# Patient Record
Sex: FEMALE | Race: WHITE | NOT HISPANIC OR LATINO | URBAN - METROPOLITAN AREA
[De-identification: names, ages, dates, MRNs, and addresses within clinical notes are randomized per-mention and may not be internally consistent; named-entity substitution may affect disease eponyms.]

---

## 2018-03-03 ENCOUNTER — INPATIENT HOSPITAL (OUTPATIENT)
Dept: URBAN - METROPOLITAN AREA HOSPITAL 107 | Facility: HOSPITAL | Age: 43
End: 2018-03-03
Payer: MEDICARE

## 2018-03-03 DIAGNOSIS — R10.11 RIGHT UPPER QUADRANT PAIN: ICD-10-CM

## 2018-03-03 DIAGNOSIS — K80.51 CALCULUS OF BILE DUCT WITHOUT CHOLANGITIS OR CHOLECYSTITIS W: ICD-10-CM

## 2018-03-03 DIAGNOSIS — R94.5 ABNORMAL RESULTS OF LIVER FUNCTION STUDIES: ICD-10-CM

## 2018-03-03 PROCEDURE — 99223 1ST HOSP IP/OBS HIGH 75: CPT | Performed by: INTERNAL MEDICINE

## 2018-03-04 PROCEDURE — 99252 IP/OBS CONSLTJ NEW/EST SF 35: CPT | Performed by: INTERNAL MEDICINE

## 2018-03-04 PROCEDURE — 99232 SBSQ HOSP IP/OBS MODERATE 35: CPT | Performed by: INTERNAL MEDICINE

## 2020-06-06 ENCOUNTER — HOSPITAL ENCOUNTER (OUTPATIENT)
Facility: HOSPITAL | Age: 45
Setting detail: OBSERVATION
LOS: 1 days | Discharge: HOME | End: 2020-06-09
Attending: EMERGENCY MEDICINE | Admitting: HOSPITALIST

## 2020-06-06 DIAGNOSIS — T39.1X2A INTENTIONAL ACETAMINOPHEN OVERDOSE, INITIAL ENCOUNTER (HCC): Primary | ICD-10-CM

## 2020-06-06 DIAGNOSIS — F10.920 ALCOHOLIC INTOXICATION WITHOUT COMPLICATION (HCC): ICD-10-CM

## 2020-06-06 DIAGNOSIS — F32.A DEPRESSION, UNSPECIFIED DEPRESSION TYPE: ICD-10-CM

## 2020-06-06 LAB
ALBUMIN SERPL-MCNC: 4.1 G/DL (ref 3.5–5.2)
ALBUMIN/GLOB SERPL: 1.4 G/DL
ALP SERPL-CCNC: 76 U/L (ref 39–117)
ALT SERPL W P-5'-P-CCNC: 10 U/L (ref 1–33)
AMPHET+METHAMPHET UR QL: NEGATIVE
ANION GAP SERPL CALCULATED.3IONS-SCNC: 11.5 MMOL/L (ref 5–15)
APAP SERPL-MCNC: 107 MCG/ML (ref 10–30)
APAP SERPL-MCNC: 125 MCG/ML (ref 10–30)
AST SERPL-CCNC: 13 U/L (ref 1–32)
BARBITURATES UR QL SCN: NEGATIVE
BASOPHILS # BLD AUTO: 0.08 10*3/MM3 (ref 0–0.2)
BASOPHILS NFR BLD AUTO: 0.9 % (ref 0–1.5)
BENZODIAZ UR QL SCN: NEGATIVE
BILIRUB SERPL-MCNC: 0.2 MG/DL (ref 0.2–1.2)
BUN BLD-MCNC: 11 MG/DL (ref 6–20)
BUN/CREAT SERPL: 12.1 (ref 7–25)
CALCIUM SPEC-SCNC: 8.5 MG/DL (ref 8.6–10.5)
CANNABINOIDS SERPL QL: NEGATIVE
CHLORIDE SERPL-SCNC: 110 MMOL/L (ref 98–107)
CO2 SERPL-SCNC: 19.5 MMOL/L (ref 22–29)
COCAINE UR QL: NEGATIVE
CREAT BLD-MCNC: 0.91 MG/DL (ref 0.57–1)
DEPRECATED RDW RBC AUTO: 45.8 FL (ref 37–54)
EOSINOPHIL # BLD AUTO: 0.06 10*3/MM3 (ref 0–0.4)
EOSINOPHIL NFR BLD AUTO: 0.7 % (ref 0.3–6.2)
ERYTHROCYTE [DISTWIDTH] IN BLOOD BY AUTOMATED COUNT: 13.8 % (ref 12.3–15.4)
ETHANOL BLD-MCNC: 138 MG/DL (ref 0–10)
ETHANOL UR QL: 0.14 %
GFR SERPL CREATININE-BSD FRML MDRD: 67 ML/MIN/1.73
GFR SERPL CREATININE-BSD FRML MDRD: 81 ML/MIN/1.73
GLOBULIN UR ELPH-MCNC: 2.9 GM/DL
GLUCOSE BLD-MCNC: 127 MG/DL (ref 65–99)
HCG SERPL QL: NEGATIVE
HCT VFR BLD AUTO: 40.1 % (ref 34–46.6)
HGB BLD-MCNC: 13.4 G/DL (ref 12–15.9)
HOLD SPECIMEN: NORMAL
HOLD SPECIMEN: NORMAL
IMM GRANULOCYTES # BLD AUTO: 0.02 10*3/MM3 (ref 0–0.05)
IMM GRANULOCYTES NFR BLD AUTO: 0.2 % (ref 0–0.5)
LYMPHOCYTES # BLD AUTO: 2.74 10*3/MM3 (ref 0.7–3.1)
LYMPHOCYTES NFR BLD AUTO: 31 % (ref 19.6–45.3)
MCH RBC QN AUTO: 30.4 PG (ref 26.6–33)
MCHC RBC AUTO-ENTMCNC: 33.4 G/DL (ref 31.5–35.7)
MCV RBC AUTO: 90.9 FL (ref 79–97)
METHADONE UR QL SCN: POSITIVE
MONOCYTES # BLD AUTO: 0.66 10*3/MM3 (ref 0.1–0.9)
MONOCYTES NFR BLD AUTO: 7.5 % (ref 5–12)
NEUTROPHILS # BLD AUTO: 5.28 10*3/MM3 (ref 1.7–7)
NEUTROPHILS NFR BLD AUTO: 59.7 % (ref 42.7–76)
NRBC BLD AUTO-RTO: 0 /100 WBC (ref 0–0.2)
OPIATES UR QL: NEGATIVE
OXYCODONE UR QL SCN: NEGATIVE
PLATELET # BLD AUTO: 417 10*3/MM3 (ref 140–450)
PMV BLD AUTO: 10.3 FL (ref 6–12)
POTASSIUM BLD-SCNC: 4.5 MMOL/L (ref 3.5–5.2)
PROT SERPL-MCNC: 7 G/DL (ref 6–8.5)
RBC # BLD AUTO: 4.41 10*6/MM3 (ref 3.77–5.28)
SALICYLATES SERPL-MCNC: <0.3 MG/DL
SODIUM BLD-SCNC: 141 MMOL/L (ref 136–145)
TROPONIN T SERPL-MCNC: <0.01 NG/ML (ref 0–0.03)
WBC NRBC COR # BLD: 8.84 10*3/MM3 (ref 3.4–10.8)
WHOLE BLOOD HOLD SPECIMEN: NORMAL
WHOLE BLOOD HOLD SPECIMEN: NORMAL

## 2020-06-06 PROCEDURE — 93010 ELECTROCARDIOGRAM REPORT: CPT | Performed by: INTERNAL MEDICINE

## 2020-06-06 PROCEDURE — 84484 ASSAY OF TROPONIN QUANT: CPT | Performed by: EMERGENCY MEDICINE

## 2020-06-06 PROCEDURE — 80307 DRUG TEST PRSMV CHEM ANLYZR: CPT | Performed by: EMERGENCY MEDICINE

## 2020-06-06 PROCEDURE — 85025 COMPLETE CBC W/AUTO DIFF WBC: CPT | Performed by: EMERGENCY MEDICINE

## 2020-06-06 PROCEDURE — 93005 ELECTROCARDIOGRAM TRACING: CPT | Performed by: EMERGENCY MEDICINE

## 2020-06-06 PROCEDURE — 84703 CHORIONIC GONADOTROPIN ASSAY: CPT | Performed by: EMERGENCY MEDICINE

## 2020-06-06 PROCEDURE — 99285 EMERGENCY DEPT VISIT HI MDM: CPT

## 2020-06-06 PROCEDURE — 96360 HYDRATION IV INFUSION INIT: CPT

## 2020-06-06 PROCEDURE — 36415 COLL VENOUS BLD VENIPUNCTURE: CPT

## 2020-06-06 PROCEDURE — 80053 COMPREHEN METABOLIC PANEL: CPT | Performed by: EMERGENCY MEDICINE

## 2020-06-06 RX ORDER — SODIUM CHLORIDE 0.9 % (FLUSH) 0.9 %
10 SYRINGE (ML) INJECTION AS NEEDED
Status: DISCONTINUED | OUTPATIENT
Start: 2020-06-06 | End: 2020-06-09 | Stop reason: HOSPADM

## 2020-06-06 RX ADMIN — SODIUM CHLORIDE 1000 ML: 9 INJECTION, SOLUTION INTRAVENOUS at 21:24

## 2020-06-07 PROBLEM — T39.1X2A INTENTIONAL ACETAMINOPHEN OVERDOSE (HCC): Status: ACTIVE | Noted: 2020-06-07

## 2020-06-07 PROBLEM — R45.851 SUICIDAL IDEATIONS: Status: ACTIVE | Noted: 2020-06-07

## 2020-06-07 PROBLEM — I10 HYPERTENSION: Status: ACTIVE | Noted: 2020-06-07

## 2020-06-07 PROBLEM — G89.4 CHRONIC PAIN SYNDROME: Status: ACTIVE | Noted: 2020-06-07

## 2020-06-07 PROBLEM — Z98.890 S/P SHOULDER SURGERY: Status: ACTIVE | Noted: 2020-06-07

## 2020-06-07 PROBLEM — F10.929 ALCOHOL INTOXICATION (HCC): Status: ACTIVE | Noted: 2020-06-07

## 2020-06-07 PROBLEM — E11.9 DIABETES MELLITUS (HCC): Status: ACTIVE | Noted: 2020-06-07

## 2020-06-07 PROBLEM — Z87.898 HISTORY OF PSEUDOSEIZURE: Status: ACTIVE | Noted: 2020-06-07

## 2020-06-07 PROBLEM — F11.20 OPIOID DEPENDENCE (HCC): Status: ACTIVE | Noted: 2020-06-07

## 2020-06-07 LAB
APAP SERPL-MCNC: 110 MCG/ML (ref 10–30)
APAP SERPL-MCNC: 8 MCG/ML (ref 10–30)

## 2020-06-07 PROCEDURE — 96361 HYDRATE IV INFUSION ADD-ON: CPT

## 2020-06-07 PROCEDURE — G0378 HOSPITAL OBSERVATION PER HR: HCPCS

## 2020-06-07 PROCEDURE — 80307 DRUG TEST PRSMV CHEM ANLYZR: CPT | Performed by: NURSE PRACTITIONER

## 2020-06-07 PROCEDURE — 96372 THER/PROPH/DIAG INJ SC/IM: CPT

## 2020-06-07 PROCEDURE — 25010000002 ENOXAPARIN PER 10 MG: Performed by: NURSE PRACTITIONER

## 2020-06-07 RX ORDER — PANTOPRAZOLE SODIUM 40 MG/1
40 TABLET, DELAYED RELEASE ORAL ONCE
Status: COMPLETED | OUTPATIENT
Start: 2020-06-07 | End: 2020-06-07

## 2020-06-07 RX ORDER — PANTOPRAZOLE SODIUM 40 MG/1
40 TABLET, DELAYED RELEASE ORAL EVERY MORNING
Status: DISCONTINUED | OUTPATIENT
Start: 2020-06-08 | End: 2020-06-09 | Stop reason: HOSPADM

## 2020-06-07 RX ORDER — MELOXICAM 15 MG/1
15 TABLET ORAL DAILY PRN
COMMUNITY

## 2020-06-07 RX ORDER — TIZANIDINE 4 MG/1
4 TABLET ORAL NIGHTLY PRN
Status: DISCONTINUED | OUTPATIENT
Start: 2020-06-07 | End: 2020-06-09 | Stop reason: HOSPADM

## 2020-06-07 RX ORDER — SODIUM CHLORIDE 0.9 % (FLUSH) 0.9 %
10 SYRINGE (ML) INJECTION AS NEEDED
Status: DISCONTINUED | OUTPATIENT
Start: 2020-06-07 | End: 2020-06-07

## 2020-06-07 RX ORDER — SODIUM CHLORIDE 9 MG/ML
100 INJECTION, SOLUTION INTRAVENOUS CONTINUOUS
Status: DISCONTINUED | OUTPATIENT
Start: 2020-06-07 | End: 2020-06-08

## 2020-06-07 RX ORDER — CALCIUM CARBONATE 200(500)MG
1 TABLET,CHEWABLE ORAL 3 TIMES DAILY PRN
Status: DISCONTINUED | OUTPATIENT
Start: 2020-06-07 | End: 2020-06-09 | Stop reason: HOSPADM

## 2020-06-07 RX ORDER — OXYCODONE HYDROCHLORIDE AND ACETAMINOPHEN 5; 325 MG/1; MG/1
1 TABLET ORAL EVERY 6 HOURS PRN
COMMUNITY

## 2020-06-07 RX ORDER — PRAMIPEXOLE DIHYDROCHLORIDE 1 MG/1
1 TABLET ORAL NIGHTLY
COMMUNITY

## 2020-06-07 RX ORDER — MELOXICAM 15 MG/1
15 TABLET ORAL DAILY PRN
Status: DISCONTINUED | OUTPATIENT
Start: 2020-06-07 | End: 2020-06-07

## 2020-06-07 RX ORDER — GABAPENTIN 100 MG/1
100 CAPSULE ORAL 3 TIMES DAILY
Status: DISCONTINUED | OUTPATIENT
Start: 2020-06-07 | End: 2020-06-09 | Stop reason: HOSPADM

## 2020-06-07 RX ORDER — PRAMIPEXOLE DIHYDROCHLORIDE 1 MG/1
1 TABLET ORAL NIGHTLY
Status: DISCONTINUED | OUTPATIENT
Start: 2020-06-07 | End: 2020-06-09 | Stop reason: HOSPADM

## 2020-06-07 RX ORDER — SODIUM CHLORIDE 0.9 % (FLUSH) 0.9 %
10 SYRINGE (ML) INJECTION EVERY 12 HOURS SCHEDULED
Status: DISCONTINUED | OUTPATIENT
Start: 2020-06-07 | End: 2020-06-07

## 2020-06-07 RX ORDER — AMITRIPTYLINE HYDROCHLORIDE 50 MG/1
50 TABLET, FILM COATED ORAL NIGHTLY
Status: DISCONTINUED | OUTPATIENT
Start: 2020-06-07 | End: 2020-06-09 | Stop reason: HOSPADM

## 2020-06-07 RX ADMIN — QUETIAPINE FUMARATE 300 MG: 100 TABLET ORAL at 22:14

## 2020-06-07 RX ADMIN — SODIUM CHLORIDE 100 ML/HR: 9 INJECTION, SOLUTION INTRAVENOUS at 15:39

## 2020-06-07 RX ADMIN — GABAPENTIN 100 MG: 100 CAPSULE ORAL at 15:39

## 2020-06-07 RX ADMIN — ENOXAPARIN SODIUM 40 MG: 40 INJECTION SUBCUTANEOUS at 18:24

## 2020-06-07 RX ADMIN — PANTOPRAZOLE SODIUM 40 MG: 40 TABLET, DELAYED RELEASE ORAL at 15:58

## 2020-06-07 RX ADMIN — PRAMIPEXOLE DIHYDROCHLORIDE 1 MG: 1 TABLET ORAL at 22:15

## 2020-06-07 RX ADMIN — GABAPENTIN 100 MG: 100 CAPSULE ORAL at 20:32

## 2020-06-07 RX ADMIN — PROPRANOLOL HYDROCHLORIDE 60 MG: 40 TABLET ORAL at 22:36

## 2020-06-07 RX ADMIN — AMITRIPTYLINE HYDROCHLORIDE 50 MG: 50 TABLET, FILM COATED ORAL at 22:15

## 2020-06-07 NOTE — H&P
"    Patient Name:  Concepcion Ocampo  YOB: 1975  MRN:  [id number]  Admit Date:  6/6/2020  Patient Care Team:  Jeremy Shankar MD as PCP - General (Family Medicine)      Subjective   History Present Illness     Chief Complaint   Patient presents with   • Drug Overdose     History of Present Illness   Ms. Ocampo is a 44 y.o. non-smoker with a history of DM2, prior gastric bypass, recent rotator cuff repair, HTN, pseudoseizures and chronic pain syndrome that presents to [facility] for overdose. The patient reportedly drank alcohol yesterday and ingested an unknown amount of Tylenol in an attempt to end her life last night. She was picked up by EMS after family called 911 and brought to the emergency room where her acetaminophen levels were 107, 125 and 110. Repeat this morning was at 8.0. Her ethanol level was also up at 138 after she reported drinking 4 large bottle/cans of [place] wine coolers. She does not usually drink alcohol on a regular basis by her report. All other lab work was unremarkable and she was seen in consultation by [facility] and is need of psychiatric admission. Unfortunately, there were no beds available so she is being monitored in the medical portion of the hospital for now.   She denies any recent physical complaints including chest pain, dyspnea, nausea, vomiting or changes in bowel or bladder. She reports a history of gastric bypass as well as DM2, which improved with her weight loss. She does not take regular medication for her DM. She did recently have a rotator cuff repair done on 5/19/20 with a  with [employer] and has a sling in place for this. She states she has been on Percocet 5 mg for this reason as well as as history of chronic regional pain syndrome for which she sees Pain Management. She thinks the name of her pain medicine doctor is \"Dr. Macdonald,\" but is unsure as she normally sees the APRN there. Orthopedic surgery did prescribe her Oxycodone " for her shoulder pain it appears from prior chart review. However, there is no BRITTNI available to confirm the details of her pain medication. She also has a documented history of non-epileptic seizures and was hospitalized in [place] in 2019. She does not take any anticonvulsant therapy and has not had any further seizure-like activity since that time.      Review of Systems   Constitutional: Negative for activity change, appetite change, chills, fatigue and fever.   HENT: Negative for congestion, ear pain and nosebleeds.    Eyes: Negative for pain and discharge.   Respiratory: Negative for cough, choking, chest tightness and shortness of breath.    Cardiovascular: Negative for chest pain and leg swelling.   Gastrointestinal: Negative for abdominal distention, abdominal pain, constipation, diarrhea and vomiting.   Endocrine: Negative for cold intolerance and heat intolerance.   Genitourinary: Negative for difficulty urinating and dysuria.   Musculoskeletal: Positive for arthralgias. Negative for back pain and gait problem.   Skin: Negative for color change and pallor.   Neurological: Negative for dizziness, speech difficulty and headaches.   Psychiatric/Behavioral: Negative for confusion. The patient is not nervous/anxious.       Personal History     Past Medical History:   Diagnosis Date   • Diabetes mellitus (CMS/HCC)    • Hypertension    • Seizures (CMS/HCC)      Past Surgical History:   Procedure Laterality Date   • GASTRIC BYPASS     • ROTATOR CUFF REPAIR       No family history on file.  Social History     Tobacco Use   • Smoking status: Not on file   Substance Use Topics   • Alcohol use: Not on file   • Drug use: Not on file       (Not in a hospital admission)  Allergies:    Allergies   Allergen Reactions   • Albuterol Provider Review Needed       Objective    Objective     Vital Signs  Temp:  [98.1 °F (36.7 °C)-98.5 °F (36.9 °C)] 98.1 °F (36.7 °C)  Heart Rate:  [] 81  Resp:  [16-18] 17  BP:  (149-175)/() 153/95  SpO2:  [93 %-100 %] 98 %  on   ;      Body mass index is 23.49 kg/m².    Physical Exam   Constitutional: She is oriented to person, place, and time. She appears well-developed and well-nourished. No distress.   HENT:   Head: Normocephalic and atraumatic.   Nose: Nose normal.   Mouth/Throat: Oropharynx is clear and moist.   Eyes: Conjunctivae are normal. Right eye exhibits no discharge. Left eye exhibits no discharge.   Neck: Normal range of motion. Neck supple.   Cardiovascular: Normal rate, regular rhythm, normal heart sounds and intact distal pulses.   Pulmonary/Chest: Effort normal and breath sounds normal. No respiratory distress.   Abdominal: Soft. Bowel sounds are normal. She exhibits no distension. There is no tenderness.   Musculoskeletal: Normal range of motion. She exhibits no edema or tenderness.   Right arm in sling.   Neurological: She is alert and oriented to person, place, and time. She exhibits normal muscle tone. Coordination normal.   Skin: Skin is warm and dry. She is not diaphoretic. No erythema.   Psychiatric: She has a normal mood and affect. Her behavior is normal.   Nursing note and vitals reviewed.     Results Review:  I reviewed the patient's new clinical results.  I reviewed the patient's new imaging results and agree with the interpretation.  I reviewed the patient's other test results and agree with the interpretation  I personally viewed and interpreted the patient's EKG/Telemetry data    Lab Results (last 24 hours)     Procedure Component Value Units Date/Time    CBC & Differential [[id number]] Collected:  06/06/20 2051    Specimen:  Blood Updated:  06/06/20 2104    Narrative:       The following orders were created for panel order CBC & Differential.  Procedure                               Abnormality         Status                     ---------                               -----------         ------                     CBC Auto Differential[[id number]]         Normal              Final result                 Please view results for these tests on the individual orders.    hCG, Serum, Qualitative [[id number]]  (Normal) Collected:  06/06/20 2051    Specimen:  Blood Updated:  06/06/20 2119     HCG Qualitative Negative    Acetaminophen Level [[id number]]  (Abnormal) Collected:  06/06/20 2051    Specimen:  Blood Updated:  06/06/20 2132     Acetaminophen 107.0 mcg/mL     Salicylate Level [[id number]]  (Normal) Collected:  06/06/20 2051    Specimen:  Blood Updated:  06/06/20 2128     Salicylate <0.3 mg/dL     Narrative:       Therapeutic range for Salicylates:  3.0 - 10.0 mg/dL for antipyretic/analgesic conditions  15.0 - 30.0 mg/dL for anti-inflammatory conditions    Ethanol [[id number]]  (Abnormal) Collected:  06/06/20 2051    Specimen:  Blood Updated:  06/06/20 2128     Ethanol 138 mg/dL      Ethanol % 0.138 %     Troponin [[id number]]  (Normal) Collected:  06/06/20 2051    Specimen:  Blood Updated:  06/06/20 2127     Troponin T <0.010 ng/mL     Narrative:       Troponin T Reference Range:  <= 0.03 ng/mL-   Negative for AMI  >0.03 ng/mL-     Abnormal for myocardial necrosis.  Clinicians would have to utilize clinical acumen, EKG, Troponin and serial changes to determine if it is an Acute Myocardial Infarction or myocardial injury due to an underlying chronic condition.       Results may be falsely decreased if patient taking Biotin.      Comprehensive Metabolic Panel [[id number]]  (Abnormal) Collected:  06/06/20 2051    Specimen:  Blood Updated:  06/06/20 2130     Glucose 127 mg/dL      BUN 11 mg/dL      Creatinine 0.91 mg/dL      Sodium 141 mmol/L      Potassium 4.5 mmol/L      Chloride 110 mmol/L      CO2 19.5 mmol/L      Calcium 8.5 mg/dL      Total Protein 7.0 g/dL      Albumin 4.10 g/dL      ALT (SGPT) 10 U/L      AST (SGOT) 13 U/L      Comment: Specimen hemolyzed.  Results may be affected.        Alkaline Phosphatase 76 U/L      Total Bilirubin 0.2 mg/dL      eGFR Non   Amer 67 mL/min/1.73      eGFR  African Amer 81 mL/min/1.73      Globulin 2.9 gm/dL      A/G Ratio 1.4 g/dL      BUN/Creatinine Ratio 12.1     Anion Gap 11.5 mmol/L     Narrative:       GFR Normal >60  Chronic Kidney Disease <60  Kidney Failure <15      CBC Auto Differential [[id number]]  (Normal) Collected:  06/06/20 2051    Specimen:  Blood Updated:  06/06/20 2104     WBC 8.84 10*3/mm3      RBC 4.41 10*6/mm3      Hemoglobin 13.4 g/dL      Hematocrit 40.1 %      MCV 90.9 fL      MCH 30.4 pg      MCHC 33.4 g/dL      RDW 13.8 %      RDW-SD 45.8 fl      MPV 10.3 fL      Platelets 417 10*3/mm3      Neutrophil % 59.7 %      Lymphocyte % 31.0 %      Monocyte % 7.5 %      Eosinophil % 0.7 %      Basophil % 0.9 %      Immature Grans % 0.2 %      Neutrophils, Absolute 5.28 10*3/mm3      Lymphocytes, Absolute 2.74 10*3/mm3      Monocytes, Absolute 0.66 10*3/mm3      Eosinophils, Absolute 0.06 10*3/mm3      Basophils, Absolute 0.08 10*3/mm3      Immature Grans, Absolute 0.02 10*3/mm3      nRBC 0.0 /100 WBC     Urine Drug Screen - Urine, Clean Catch [[id number]]  (Abnormal) Collected:  06/06/20 2133    Specimen:  Urine, Clean Catch Updated:  06/06/20 2203     Amphet/Methamphet, Screen Negative     Barbiturates Screen, Urine Negative     Benzodiazepine Screen, Urine Negative     Cocaine Screen, Urine Negative     Opiate Screen Negative     THC, Screen, Urine Negative     Methadone Screen, Urine Positive     Oxycodone Screen, Urine Negative    Narrative:       Negative Thresholds For Drugs Screened:     Amphetamines               500 ng/ml   Barbiturates               200 ng/ml   Benzodiazepines            100 ng/ml   Cocaine                    300 ng/ml   Methadone                  300 ng/ml   Opiates                    300 ng/ml   Oxycodone                  100 ng/ml   THC                        50 ng/ml    The Normal Value for all drugs tested is negative. This report includes final unconfirmed screening results to be  used for medical treatment purposes only. Unconfirmed results must not be used for non-medical purposes such as employment or legal testing. Clinical consideration should be applied to any drug of abuse test, particulary when unconfirmed results are used.    Acetaminophen Level [[id number]]  (Abnormal) Collected:  06/06/20 2236    Specimen:  Blood from Hand, Right Updated:  06/06/20 2311     Acetaminophen 125.0 mcg/mL     Acetaminophen Level [[id number]]  (Abnormal) Collected:  06/06/20 2346    Specimen:  Blood from Arm, Left Updated:  06/07/20 0012     Acetaminophen 110.0 mcg/mL              ECG 12 Lead   Final Result   HEART RATE= 94  bpm   RR Interval= 636  ms   CO Interval= 135  ms   P Horizontal Axis= 24  deg   P Front Axis= 39  deg   QRSD Interval= 85  ms   QT Interval= 349  ms   QRS Axis= 12  deg   T Wave Axis= 33  deg   - NORMAL ECG -   Sinus rhythm   NO PRIOR TRACING AVAILABLE FOR COMPARISON   Electronically Signed By: Freddie Montana ([facility]) 06-Jun-2020 22:56:27   Date and Time of Study: 2020-06-06 21:14:38           Assessment/Plan     Active Hospital Problems    Diagnosis POA   • **Intentional acetaminophen overdose (CMS/HCC) [T39.1X2A] Yes   • Alcohol intoxication (CMS/HCC) [F10.929] Yes   • S/P shoulder surgery [Z98.890] Not Applicable   • Suicidal ideations [R45.851] Not Applicable   • Hypertension [I10] Yes   • Type 2 diabetes mellitus without complication, without long-term current use of insulin (CMS/HCC) [E11.9] Yes   • History of pseudoseizure [Z86.69] Not Applicable   • Chronic pain syndrome [G89.4] Yes     Intentional acetaminophen overdose/suicidal ideations  -Acetaminophen level now down to 8. Monitor LFTs.  -Psychiatry to see. Awaiting bed availability for transfer to [facility].  -Will resume home medication regimen with Seroquel/Elavil/Trintellix (if patient can provide), but defer any anti-depressive/anti-psychotic therapy changes to psychiatry.  -Sitter with 1:1 monitoring and suicide  precautions.    Alcohol intoxication  -Not a daily drinker per her reports. Ethanol level was up on admission.  -Will continue some IVF hydration for now and monitor for any signs of withdrawal.    S/P shoulder surgery/chronic pain syndrome  -Rotator cuff repair 5/19/20.   -Reportedly prescribed oxycodone in addition to her chronic Percocet. Will request BRITTNI. Once this is available, will resume home pain medications. Strangely, her UDS on admission was negative for opiates and positive for methadone which she denies taking. Reports she takes Percocet daily (4 x day).   -Zanaflex as needed. Remain in sling unless sleeping or showering. Follow up as previously advised.  -Gabapentin resumed.    HTN  -Resume propanolol and monitor HR and BP.    DM2  -Resolved with weight loss.     History of pseudoseizure  -No recurrence since 2019. Monitor.    · I discussed the patients findings and my recommendations with patient, nursing staff and Dr. Fitzgerald.    VTE Prophylaxis - Lovenox 40 mg SC daily. No SCDs due to suicide precautions.  Code Status - Full code.       JANET Bales  [facility]  06/07/20  12:06     Brief Attending Admission Attestation     I have seen and examined the patient, performing an independent face-to-face diagnostic evaluation with plan of care reviewed and developed with the advanced practice registered nurse (APRN).      Brief Summary Statement/HPI  Ms. Ocampo is a 44 y.o. female with a history of depression, chronic pain, gastric bypass, etc. who presented after a intentional Tylenol overdose.  This occurred around 6 PM last night.  She took a half bottle of extra strength Tylenol Co. ingestion with alcohol.  No other abnormal drug ingestion.  She also takes 4 Percocet tablets every day.  Evidently at the scene or prior to arrival she was stating she wanted to die to be with her father that passed away many years ago.  Family states she is had worsening depression for some  time.  At the time of my exam she is alert and oriented x3 and regretful.  She denies any current suicidal or homicidal ideation.  Remainder of detailed HPI is as noted above and has been reviewed by me for completeness.    Attending Physical Exam  Temp:  [98.1 °F (36.7 °C)-98.5 °F (36.9 °C)] 98.1 °F (36.7 °C)  Heart Rate:  [] 82  Resp:  [16-18] 18  BP: (149-175)/() 153/95  Constitutional: alert, cooperative and no distress  Neck: no JVD  Respiratory: clear to auscultation, unlabored  Cardiovascular: regular rate and rhythm, S1, S2 normal, no murmur  Abdominal: soft, non-tender, non-distended; BS normal; no masses or organomegaly  Musculoskeletal: no edema  Neurological: alert and oriented x 3, strength and sensation grossly normal    Assessment/Plan  Per the emergency department Acetadote not indicated.  Tylenol level this morning essentially negative.  Will monitor LFTs and of course consult psychiatry.  Sitter at bedside.    Active Hospital Problems    Diagnosis  POA   • **Intentional acetaminophen overdose (CMS/HCC) [T39.1X2A]  Yes   • Alcohol intoxication (CMS/HCC) [F10.929]  Yes   • S/P shoulder surgery [Z98.890]  Not Applicable   • Suicidal ideations [R45.851]  Not Applicable   • Hypertension [I10]  Yes   • Type 2 diabetes mellitus without complication, without long-term current use of insulin (CMS/HCC) [E11.9]  Yes   • History of pseudoseizure [Z86.69]  Not Applicable   • Chronic pain syndrome [G89.4]  Yes   • Opioid dependence (CMS/HCC) [F11.20]  Yes      Resolved Hospital Problems   No resolved problems to display.     See above section for further detailed assessment and plan developed with APRN which I have reviewed.      Electronically signed by Inocente Fitzgerald MD, 6/7/2020, 13:16.

## 2020-06-07 NOTE — ED NOTES
"EMS picked pt up from home. Pt family called and told EMS that she accidentally took an unknown amount of tylenol 500 mg. EMS reports after arousing pt intially pt states \"I want to be with my dad in [facility].\" Pt family reports that she had 4-5 wine coolers spaced out throughout the day. Pt aroused with sternal rub and painful stimuli. Pt withdrew arm from EMS upon IV insertion.     Pt had rotator cuff surgery on the right shoulder 3 wks ago. And gastric bypass surgery recently as well. Pt family reports that they keep her med away from her.       EMS reports all other medications were accounted for except maybe one gabapentin.      Pt, EMS and RN wearing masks during pt interaction.     Clau Kemp RN  06/06/20 2045       Clau Kemp RN  06/06/20 2050       Clau Kemp RN  06/06/20 2106    "

## 2020-06-07 NOTE — ED NOTES
Pt appears to be in NAD at this time. 1:1 sitter at bedside. Ordered pt a breakfast tray as requested. No other needs as this time. Pt pending disposition from psych. Will continue to monitor.     Pt wore surgical mask throughout this entire interaction. This RN wore appropriate PPE and hand hygiene was performed before and after interaction with patient.          Liza Hugo, RN  06/07/20 0864

## 2020-06-07 NOTE — ED NOTES
Sitter acquired from  to go to floor with patient when she gets a room     Britton Cristina RN  06/07/20 1200

## 2020-06-07 NOTE — ED NOTES
"MD paiz and this RN conducted interview. Pt states she is she took a little over half a bottle of 500mg tylenol tonight in order to \"be with my dad.\" pt states she has drank tonight and just wanted to \"go to sleep and not wake up.\" Pt states she did this around 6-7pm tonight. Pt presents as drowsy and \"just wanting to sleep.\" Pt denies any previous hx of SI but has hx of depression and anxiety. Pt states she has \"had the thoughts before\" but never acted on them. Pt states she feels sad about her dad, and wants to be with her father, whom passed 5 years ago. Pt denies HI at this time. Security c pt at this time. SI precautions in place.      Inocente Goldman, RN  06/06/20 5893    "

## 2020-06-07 NOTE — ED NOTES
"Pt verbalized to this RN that she is \"upset\" because of the long wait time. Pt states that she would like \"pain medications,\" as she is prescribed them for her shoulder and chronic conditions. MD Ahn updated on this and pt informed previously by MD Mccarthy about the safety concerns c additional pain medications.       Inocente Goldman, RN  06/07/20 0533    "

## 2020-06-07 NOTE — ED PROVIDER NOTES
" EMERGENCY DEPARTMENT ENCOUNTER    Room Number:  P690/1  Date of encounter:  6/10/2020  PCP: Jeremy Shankar MD  Historian: Patient     I used full protective equipment while examining this patient.  This includes face mask, gloves and protective eyewear.  I washed my hands before entering the room and immediately upon leaving the room.  Patient was wearing a surgical mask.      HPI:  Chief Complaint: Intentional overdose  A complete HPI/ROS/PMH/PSH/SH/FH are unobtainable due to: None    Context: Concepcion Ocampo is a 44 y.o. female who presents to the ED c/o intentional overdose.  Patient reports that she took \"about half a bottle of Tylenol\" several hours ago.  Patient is unsure of the exact time but thinks it was between 6 and 7 PM.  Patient states she was trying to harm herself because \"I want to be with my dad in [facility]\".  Patient states she has been depressed for the past few weeks.  She denies any prior suicidal attempts.  She also admits to drinking several bottles of beer throughout the day today.  She denies taking any other medications today other than her prescribed medications.  She denies using any illicit drugs.  Patient denies recent illness, fever, cough, chest pain, shortness of breath, nausea, vomiting, abdominal pain, dysuria, headache, or known exposure to anyone diagnosed COVID-19.  Accu-Chek was 114 per EMS.  EMS reports that all the patient's medications were accounted for except \"maybe 1 gabapentin\".      PAST MEDICAL HISTORY  Active Ambulatory Problems     Diagnosis Date Noted   • No Active Ambulatory Problems     Resolved Ambulatory Problems     Diagnosis Date Noted   • No Resolved Ambulatory Problems     Past Medical History:   Diagnosis Date   • Diabetes mellitus (CMS/HCC)    • Hypertension    • Seizures (CMS/HCC)          PAST SURGICAL HISTORY  Past Surgical History:   Procedure Laterality Date   • GASTRIC BYPASS     • ROTATOR CUFF REPAIR           FAMILY HISTORY  No family history on " file.      SOCIAL HISTORY  Social History     Socioeconomic History   • Marital status:      Spouse name: Not on file   • Number of children: Not on file   • Years of education: Not on file   • Highest education level: Not on file         ALLERGIES  Albuterol       REVIEW OF SYSTEMS  Review of Systems      All systems have been reviewed and are negative except as as discussed in the HPI    PHYSICAL EXAM    I have reviewed the triage vital signs and nursing notes.    ED Triage Vitals [06/06/20 2049]   Temp Heart Rate Resp BP SpO2   98.5 °F (36.9 °C) 104 18 175/75 97 %      Temp src Heart Rate Source Patient Position BP Location FiO2 (%)   Tympanic -- -- -- --       Physical Exam  GENERAL: Appears drowsy but is in no acute distress  HENT: NCAT, nares patent, moist mucous membranes  NECK: supple, no lymphadenopathy  EYES: no scleral icterus  CV: regular rhythm, regular rate, no murmur  RESPIRATORY: normal effort, clear to auscultation bilaterally  ABDOMEN: soft, nontender, nondistended  MUSCULOSKELETAL: Right upper extremity is in a sling.  There is normal range of motion in all other extremities.  There is no calf tenderness or pedal edema  NEURO: Patient is oriented to person.  She does not know the name of the hospital, day, or month.  Speech is clear.   No facial droop.  Normal sensation in all extremities.  SKIN: warm, dry, no rash  PSYCH: Depressed mood, positive suicidal ideation      LAB RESULTS  No results found for this or any previous visit (from the past 24 hour(s)).    Ordered the above labs and independently reviewed the results.      RADIOLOGY  No Radiology Exams Resulted Within Past 24 Hours    I ordered the above noted radiological studies. Reviewed by me and discussed with radiologist.  See dictation for official radiology interpretation.      PROCEDURES  Procedures      MEDICATIONS GIVEN IN ER    Medications   sodium chloride 0.9 % bolus 1,000 mL (0 mL Intravenous Stopped 6/6/20 1774)    pantoprazole (PROTONIX) EC tablet 40 mg (40 mg Oral Given 6/7/20 2256)         PROGRESS, DATA ANALYSIS, CONSULTS, AND MEDICAL DECISION MAKING    All labs have been independently reviewed by me.  All radiology studies have been reviewed by me and discussed with radiologist dictating the report.   EKG's independently viewed and interpreted by me.  I have reviewed the nurse's notes, vital signs, past medical history, and medication list.  Discussion below represents my analysis of pertinent findings related to patient's condition, differential diagnosis, treatment plan and final disposition.      ED Course as of Jhoan 10 1116   Sat Jun 06, 2020 2135 EKG          EKG time: 2114  Rhythm/Rate: Sinus rhythm rate 94  P waves and MN: Normal  QRS, axis: Normal  ST and T waves: Normal    Interpreted Contemporaneously by me, independently viewed  No prior available for comparison       [WH]   2156 Initial Tylenol level is elevated at 107.  Patient is still unsure of the exact time she ingested Tylenol but states it was not before 6 PM tonight.  Will obtain a repeat Tylenol level at 2230.    [WH]   2322 Repeat Tylenol level has increased to 125.  Will check another level now to see if it continues to trend upwards.    [WH]   Sun Jun 07, 2020   0014 Tylenol levels now trending downward.  Patient does not need treatment with [facility].  Patient will be seen by Access.   Acetaminophen(!!): 110.0 [WH]   0310 Patient is resting comfortably and awaiting access evaluation.  Test results were discussed with the patient.  She is requesting pain medication for her shoulder.  She had surgery on that shoulder 3 weeks ago.  Patient is clinically sober.    [WH]   0322 Patient's care was turned over to Dr. Ahn.  Access evaluation is pending.    [WH]   0909 The patient has been seen and evaluated by access-the plan is to find the patient in inpatient psychiatry bed.    [WC]   1121 Psych facilities have no beds available-the patient will  need to be admitted medically until a psychiatric bed is available.  Call out to medicine.    [WC]   1152 Case discussed with ANASTASIYA Elizondo on-call for A)-she accepts the patient on behalf of Dr. Fitzgerald.  Access center will follow.    [WC]      ED Course User Index  [WC] Darwin Colón MD  [] Darwin Mccarthy MD       AS OF 11:16 VITALS:    BP - 130/87  HR - 86  TEMP - 97.4 °F (36.3 °C) (Oral)  O2 SATS - 98%      DIAGNOSIS  Final diagnoses:   Intentional acetaminophen overdose, initial encounter (CMS/HCC)   Alcoholic intoxication without complication (CMS/HCC)   Depression, unspecified depression type         DISPOSITION  Pending    Please note that this document was completed using voice recognition software     Darwin Mccarthy MD  06/10/20 4243

## 2020-06-07 NOTE — ED NOTES
Lj from [facility] speaking with the [facility] about potential transfer     Britton Cristina RN  06/07/20 2511

## 2020-06-07 NOTE — CONSULTS
"Patient is a 44 year old,  female seen today in ED 21. Last night she began drinking which she states she hardly ever does, and decided to take several Tylenol in an attempt to end her life. Her daughter is the one who found her and subsequently called EMS to bring her to the ED.     On assessment, she is alert and oriented X4. She makes appropriate eye contact and is clean and dressed appropriately in pajamas. Her mood is slightly anxious and her affect is blunted. She is very passive and resistant when answering questions, stating she knows she does not need to be here. She denies current SI or HI and does admit to remorse for taking the Tylenol stating, \"It was a stupid thing to do\". Acetaminophen level was elevated upon arrival. Her insight and judgement seem to be impaired as she does not feel like she needs to be here. She continues to minimize her actions from last night.    She denies any previous treatment history with a psychiatrist or ever requiring inpatient admission. She does currently take Trintellix 20 mg daily, prescribed by her PCP, and has seen a counselor several years ago. Her biggest stressors are the loss of her father, over 25 years ago, and the fact that she had to place her mother in a nursing home a year ago. \"I usually get to see her three times a week, and since Covid happened, I haven't been able to. Well, I visited her outside of her window and she told me she was depressed and thought she was going to be depressed until the day she  and it just broke my heart because I couldn't help her.\" She denies any drug or tobacco use, and reports only drinking ETOH very rarely. She states she takes Percocet for her CRPS - (Complex Regional Pain Syndrome) that is prescribed by pain management. She does not work as she is disabled because of this disorder. She also had surgery on her right shoulder after tearing her rotator cuff approximately 2 weeks ago. Per patient and previous " "notes from [facility], patient is planning to start physical therapy on June 30. She is advised to wear a sling on her arm at all times, except when showering or sleeping, which she is compliant with and she does have sling with her. Surgeon prescribed patient Roxicodone in addition to her Percocet that she takes for her back. Discussed with patient that her tox screen was positive for methadone, and by response, I believe this is likely a false positive as she is taking several other opiates.     She lives at home with her  of 25 years, Reyes and her 25 year old daughter, Tangela. She has another adult child as well that lives outside of the home. Patient gave clinician permission to contact  and daughter however states  may not answer his cell phone. Attempted to call both  and daughter, message left to return call.    Daughter, Tangela Ocampo returned call. She reports that last night her mother began drinking, \"and she doesn't normally drink (due to gastric bypass), but I was letting her have a few, but then she started getting emotional, and I tried to just get her to go to bed.\" Daughter reports that shortly after this, she found her mother in bed, next to an empty bottle of tylenol and patient continued saying, \"I just want to go be with my daddy\" who passed away over 25 years ago. Daughter states that patient struggles with depression and does take medication but has been decompensating over the last few months due to \"things going on at home with her \" and the stress of her mother being in the nursing home. Daughter reports that despite her mother denying SI currently, she fears for her safety in returning home. \"I work 10 hour shifts Sunday - Wednesday, so she would be there by herself and I'd be worried she may try again.\"    Due to the suicide attempt of overdose on tylenol and no safe plan for discharge, patient needs inpatient admission to psychiatric unit. [facility] currently full. " Will attempt to find placement elsewhere.     Attempted to place patient with [facility], however they declined due to her recent surgery and required sling (not allowed on unit at their facility). Discussed with Dr. Colón that patient will likely be waiting in the ED for quite some time until placement is found and that she is voicing pain and requesting her PTA medications. Best option at this time is to admit patient medically until psychiatric placement can be found as she is not safe to go home. Discussed this with WILLY Jarquin and patient. Patient voices understanding.

## 2020-06-07 NOTE — ED NOTES
Patient will be admitted medically then transferred to [facility] - spoke with daughter on phone     Britton Cristina RN  06/07/20 3197

## 2020-06-08 LAB
ALBUMIN SERPL-MCNC: 3.3 G/DL (ref 3.5–5.2)
ALBUMIN/GLOB SERPL: 1.5 G/DL
ALP SERPL-CCNC: 63 U/L (ref 39–117)
ALT SERPL W P-5'-P-CCNC: 5 U/L (ref 1–33)
ANION GAP SERPL CALCULATED.3IONS-SCNC: 8 MMOL/L (ref 5–15)
AST SERPL-CCNC: 9 U/L (ref 1–32)
BILIRUB SERPL-MCNC: 0.7 MG/DL (ref 0.2–1.2)
BUN BLD-MCNC: 12 MG/DL (ref 6–20)
BUN/CREAT SERPL: 18.5 (ref 7–25)
CALCIUM SPEC-SCNC: 8.3 MG/DL (ref 8.6–10.5)
CHLORIDE SERPL-SCNC: 107 MMOL/L (ref 98–107)
CO2 SERPL-SCNC: 19 MMOL/L (ref 22–29)
CREAT BLD-MCNC: 0.65 MG/DL (ref 0.57–1)
DEPRECATED RDW RBC AUTO: 45.5 FL (ref 37–54)
ERYTHROCYTE [DISTWIDTH] IN BLOOD BY AUTOMATED COUNT: 13.9 % (ref 12.3–15.4)
GFR SERPL CREATININE-BSD FRML MDRD: 98 ML/MIN/1.73
GLOBULIN UR ELPH-MCNC: 2.2 GM/DL
GLUCOSE BLD-MCNC: 105 MG/DL (ref 65–99)
HCT VFR BLD AUTO: 33.3 % (ref 34–46.6)
HGB BLD-MCNC: 11.1 G/DL (ref 12–15.9)
MCH RBC QN AUTO: 30.2 PG (ref 26.6–33)
MCHC RBC AUTO-ENTMCNC: 33.3 G/DL (ref 31.5–35.7)
MCV RBC AUTO: 90.5 FL (ref 79–97)
PLATELET # BLD AUTO: 230 10*3/MM3 (ref 140–450)
PMV BLD AUTO: 10.8 FL (ref 6–12)
POTASSIUM BLD-SCNC: 3.9 MMOL/L (ref 3.5–5.2)
PROT SERPL-MCNC: 5.5 G/DL (ref 6–8.5)
RBC # BLD AUTO: 3.68 10*6/MM3 (ref 3.77–5.28)
SODIUM BLD-SCNC: 134 MMOL/L (ref 136–145)
WBC NRBC COR # BLD: 5.54 10*3/MM3 (ref 3.4–10.8)

## 2020-06-08 PROCEDURE — 85027 COMPLETE CBC AUTOMATED: CPT | Performed by: NURSE PRACTITIONER

## 2020-06-08 PROCEDURE — 25010000002 ENOXAPARIN PER 10 MG: Performed by: NURSE PRACTITIONER

## 2020-06-08 PROCEDURE — 80053 COMPREHEN METABOLIC PANEL: CPT | Performed by: HOSPITALIST

## 2020-06-08 PROCEDURE — G0378 HOSPITAL OBSERVATION PER HR: HCPCS

## 2020-06-08 PROCEDURE — 96361 HYDRATE IV INFUSION ADD-ON: CPT

## 2020-06-08 PROCEDURE — 96372 THER/PROPH/DIAG INJ SC/IM: CPT

## 2020-06-08 PROCEDURE — 36415 COLL VENOUS BLD VENIPUNCTURE: CPT | Performed by: NURSE PRACTITIONER

## 2020-06-08 RX ORDER — OXYCODONE HYDROCHLORIDE 5 MG/1
5 TABLET ORAL EVERY 4 HOURS PRN
Status: DISCONTINUED | OUTPATIENT
Start: 2020-06-08 | End: 2020-06-09 | Stop reason: HOSPADM

## 2020-06-08 RX ADMIN — PROPRANOLOL HYDROCHLORIDE 60 MG: 40 TABLET ORAL at 21:43

## 2020-06-08 RX ADMIN — ENOXAPARIN SODIUM 40 MG: 40 INJECTION SUBCUTANEOUS at 19:03

## 2020-06-08 RX ADMIN — GABAPENTIN 100 MG: 100 CAPSULE ORAL at 16:41

## 2020-06-08 RX ADMIN — GABAPENTIN 100 MG: 100 CAPSULE ORAL at 21:43

## 2020-06-08 RX ADMIN — SODIUM CHLORIDE 100 ML/HR: 9 INJECTION, SOLUTION INTRAVENOUS at 00:44

## 2020-06-08 RX ADMIN — OXYCODONE 5 MG: 5 TABLET ORAL at 19:03

## 2020-06-08 RX ADMIN — GABAPENTIN 100 MG: 100 CAPSULE ORAL at 08:55

## 2020-06-08 RX ADMIN — AMITRIPTYLINE HYDROCHLORIDE 50 MG: 50 TABLET, FILM COATED ORAL at 21:43

## 2020-06-08 RX ADMIN — PRAMIPEXOLE DIHYDROCHLORIDE 1 MG: 1 TABLET ORAL at 21:43

## 2020-06-08 RX ADMIN — OXYCODONE 5 MG: 5 TABLET ORAL at 15:00

## 2020-06-08 RX ADMIN — PANTOPRAZOLE SODIUM 40 MG: 40 TABLET, DELAYED RELEASE ORAL at 07:29

## 2020-06-08 RX ADMIN — QUETIAPINE FUMARATE 300 MG: 100 TABLET ORAL at 21:43

## 2020-06-08 NOTE — CONSULTS
IDENTIFYING INFORMATION: The patient is a 44-year-old white female admitted following an ingestion of alcohol and acetaminophen.    CHIEF COMPLAINT: I do not think I would have done this if I had not been drinking    INFORMANT: Patient and chart    RELIABILITY: Good    HISTORY OF PRESENT ILLNESS: The patient is a 44-year-old white female with no history of inpatient psychiatric treatment.  She has been treated with Trintellix 20 mg daily by her primary care physician and reports that this medication has been effective for her.  The patient reports that she has been increasingly depressed secondary to her mother's confinement to a nursing home and the patient's inability to visit with her secondary to the COVID-19 lockdown.  She reports that she drinks several bottles of beer on the day of admission and then took an overdose of Tylenol.  She is appropriately regretful of the events leading to hospitalization and currently denies suicidal or homicidal lesion.  She denies prior suicide attempts or gestures.  She denies recent changes in sleep or appetite.  The patient lives with her .    PAST PSYCHIATRIC HISTORY: As above    PAST MEDICAL HISTORY: Significant for recent shoulder surgery, hypertension,    MEDICATIONS:   Medications Prior to Admission   Medication Sig Dispense Refill Last Dose   • AMITRIPTYLINE HCL PO Take 50 mg by mouth Every Night.      • GABAPENTIN PO Take 100 mg by mouth 3 (Three) Times a Day.      • meloxicam (MOBIC) 15 MG tablet Take 15 mg by mouth Daily As Needed for Mild Pain .      • OMEPRAZOLE PO Take 40 mg by mouth Daily.      • Ondansetron HCl (ZOFRAN PO) Take 4 mg by mouth Every 6 (Six) Hours As Needed (Nausea & Vomiting).      • OXYCODONE HCL PO Take 5 mg by mouth Every 4 (Four) Hours As Needed (Pain from shoulder surgery).      • oxyCODONE-acetaminophen (PERCOCET) 5-325 MG per tablet Take 1 tablet by mouth Every 6 (Six) Hours As Needed for Moderate Pain  (chronic back pain).      •  pramipexole (MIRAPEX) 1 MG tablet Take 1 mg by mouth Every Night.      • PROPRANOLOL HCL PO Take 60 mg by mouth Every Night.      • QUETIAPINE FUMARATE PO Take 300 mg by mouth Every Night.      • TIZANIDINE HCL PO Take 4 mg by mouth At Night As Needed (muscle spasm).      • Vortioxetine HBr (Trintellix) 10 MG tablet Take 10 mg by mouth Daily.            ALLERGIES: To albuterol    FAMILY HISTORY: Noncontributory    SOCIAL HISTORY: The patient lives with her .  She reports that her alcohol use is sporadic.    MENTAL STATUS EXAM: Patient is well-developed well-nourished white female appearing her stated age.  She is no apparent physical distress at the time semination.  She is awake alert and oriented spheres her mood is euthymic her affect full range.  Speech is well coherent.  There are no deficits memory cognition noted.  Intelligence is judged to be average range based on fund of knowledge, the patient is properly identified.  She denies current suicidal homicidal ideation psychotic features.  Her judgment insight appear to be intact.  No signs of alcohol withdrawal noted.    ASSETS/LIABILITIES: Motivation for change    DIAGNOSTIC IMPRESSION: Major depressive sworder recurrent moderate, alcohol induced mood disorder, status post ingestion of alcohol and acetaminophen,    PLAN: I would recommend continuing the patient's Trintellix and watching for any signs of alcohol withdrawal.  At this point a sitter should remain with the patient.  Because no bed is available on [facility] at this point and because of the patient's medical status transfer to another facility or to the [facility] at this point not feasible.  I will have the [facility] see the patient again tomorrow as I feel as though at this point she is appropriately regretful and the circumstances of her ingestion are such that she may not necessarily require inpatient psychiatric care.    Thank you for the opportunity to see this patient.

## 2020-06-08 NOTE — PLAN OF CARE
Problem: Patient Care Overview  Goal: Plan of Care Review  Outcome: Ongoing (interventions implemented as appropriate)  Flowsheets (Taken 6/8/2020 1949)  Plan of Care Reviewed With: patient  Outcome Summary: VSS. IV fluids discontinued today. Psych consulted, seen by Dr. Iverson, no bed available in [facility]. Oxycodone given for pain to right shoulder. Sitter at bedside.

## 2020-06-08 NOTE — PROGRESS NOTES
"    Name: Concepcion Ocampo ADMIT: 2020   : 1975  PCP: Jeremy Shankar MD    MRN: [id number] LOS: 1 days   AGE/SEX: 44 y.o. female  ROOM: [facility]     Subjective   Subjective   No new complaints except chronic pain and shoulder pain. Mildly anxious. Daughter is at bedside.  Patient feeling \" clear headed\" about what happened and knows that she cannot drink alcohol.  She does not think actions would have occurred if she was sober.   Sitter at bedside  Review of Systems     Objective   Objective   Vital Signs  Temp:  [97.2 °F (36.2 °C)-98 °F (36.7 °C)] 97.2 °F (36.2 °C)  Heart Rate:  [] 87  Resp:  [16-18] 18  BP: (117-155)/(74-92) 131/88  SpO2:  [95 %-99 %] 97 %  on   ;   Device (Oxygen Therapy): room air  Body mass index is 23.49 kg/m².  Physical Exam   Constitutional: She is oriented to person, place, and time. She appears well-developed and well-nourished. No distress.   HENT:   Head: Normocephalic and atraumatic.   Nose: Nose normal.   Mouth/Throat: Oropharynx is clear and moist.   Eyes: Conjunctivae and EOM are normal.   Neck: Normal range of motion. Neck supple. No tracheal deviation present. No thyromegaly present.   Cardiovascular: Normal rate, regular rhythm and normal heart sounds. Exam reveals no gallop and no friction rub.   No murmur heard.  Pulmonary/Chest: Effort normal and breath sounds normal. No respiratory distress. She has no wheezes. She has no rales. She exhibits no tenderness.   Abdominal: Soft. Bowel sounds are normal. She exhibits no distension and no mass. There is no tenderness. There is no rebound and no guarding. No hernia.   Musculoskeletal: Normal range of motion.   Neurological: She is alert and oriented to person, place, and time.   Skin: Skin is warm and dry.   Psychiatric: She has a normal mood and affect. Her speech is normal and behavior is normal.       Results Review:       I reviewed the patient's new clinical results.  Lab Results   Lab Value Date/Time    PTT " 26.5 04/23/2018 1023            Results from last 7 days   Lab Units 06/08/20  0530 06/06/20 2051   WBC 10*3/mm3 5.54 8.84   HEMOGLOBIN g/dL 11.1* 13.4   PLATELETS 10*3/mm3 230 417     Results from last 7 days   Lab Units 06/08/20  0530 06/06/20 2051   SODIUM mmol/L 134* 141   POTASSIUM mmol/L 3.9 4.5   CHLORIDE mmol/L 107 110*   CO2 mmol/L 19.0* 19.5*   BUN mg/dL 12 11   CREATININE mg/dL 0.65 0.91   GLUCOSE mg/dL 105* 127*   Estimated Creatinine Clearance: 116.1 mL/min (by C-G formula based on SCr of 0.65 mg/dL).  Results from last 7 days   Lab Units 06/08/20  0530 06/06/20 2051   ALBUMIN g/dL 3.30* 4.10   BILIRUBIN mg/dL 0.7 0.2   ALK PHOS U/L 63 76   AST (SGOT) U/L 9 13   ALT (SGPT) U/L 5 10   .  Results from last 7 days   Lab Units 06/08/20 0530 06/06/20 2051   CALCIUM mg/dL 8.3* 8.5*   ALBUMIN g/dL 3.30* 4.10       No results found for: HGBA1C, POCGLU    No image results found.    Current medication     amitriptyline 50 mg Oral Nightly   enoxaparin 40 mg Subcutaneous Q24H   gabapentin 100 mg Oral TID   pantoprazole 40 mg Oral QAM   pramipexole 1 mg Oral Nightly   propranolol 60 mg Oral Nightly   QUEtiapine 300 mg Oral Nightly   Vortioxetine HBr 10 mg Oral Daily      Diet Regular; Safe Tray       Assessment/Plan     Active Hospital Problems    Diagnosis  POA   • **Intentional acetaminophen overdose (CMS/HCC) [T39.1X2A]  Yes   • Alcohol intoxication (CMS/HCC) [F10.929]  Yes   • S/P shoulder surgery [Z98.890]  Not Applicable   • Suicidal ideations [R45.851]  Not Applicable   • Hypertension [I10]  Yes   • Type 2 diabetes mellitus without complication, without long-term current use of insulin (CMS/HCC) [E11.9]  Yes   • History of pseudoseizure [Z86.69]  Not Applicable   • Chronic pain syndrome [G89.4]  Yes   • Opioid dependence (CMS/HCC) [F11.20]  Yes      Resolved Hospital Problems   No resolved problems to display.       44 y.o. female admitted with Intentional acetaminophen overdose  (CMS/[facility]).  · Acetaminophen level normalized.  LFTs remain normal.  · Psychiatry following.  No bed available in [facility].  Sitter remains at bedside.    Continue home medication Trintellix.  · Monitor for alcohol withdrawal but patient states she is not a regular drinker.  Check INR  · Monitor through tonight. Patient regrets ingestion/SI attempt.  Possible discharge tomorrow pending psychiatry opinion  · Resume oxycodone for her shoulder surgery/chronic pain    · SCDs for DVT prophylaxis.  · Full code.  · Discussed with patient, family, nursing staff and Dr Fitzgerald.  · Anticipate discharge 1-2days. Possibly to home       JANET Ruiz  [facility]  06/08/20  12:41

## 2020-06-08 NOTE — PROGRESS NOTES
Pharmacy Consult - BRITTNI Report    Pharmacy consulted per Dr Fitzgerald. Ran BRITTNI report via [employer] website and the physical copy placed in the patient's chart after discussing with the patient's RN. RN messaged Dr. Fitzgerald to let him know BRITTNI had been run.    Pharmacy will discontinue the Pharmacy consult consult at this time seeing as consult has been completed. If needing additional date for BRITTNI, please reconsult.     Thank you,    Jey Horton PharmD, BCPS  06/08/20 10:45

## 2020-06-08 NOTE — PROGRESS NOTES
Discharge Planning Assessment  [facility]     Patient Name: Concepcion Ocampo  MRN: [id number]  Today's Date: 6/8/2020    Admit Date: 6/6/2020      Discharge Plan     Row Name 06/08/20 1310       Plan    Plan Comments  Sitter present for ETOH withdrawal.  Psych note reviewed and no bed currently in [facility].  Access following.                     Albertina Rogel RN     Health Maintenance Summary     Topic Due On Due Status Completed On    MAMMOGRAM - BREAST CANCER SCREENING May 12, 2019 Not Due May 12, 2017    Colorectal Cancer Screening - Colonoscopy Oct 28, 2009 Overdue     Pap Smear - Cervical Cancer Screening  Oct 28, 1989 Overdue     Immunization - TDAP Pregnancy  Hidden     IMMUNIZATION - DTaP/Tdap/Td Dec 8, 2019 Not Due Dec 8, 2009    Immunization-Influenza Sep 1, 2017 Not Due           Patient is due for topics as listed above, she wishes to discuss with provider.

## 2020-06-08 NOTE — PLAN OF CARE
Problem: Patient Care Overview  Goal: Plan of Care Review  Outcome: Ongoing (interventions implemented as appropriate)  Flowsheets (Taken 6/8/2020 0313)  Plan of Care Reviewed With: patient  Outcome Summary: VSS. Alert and oriented. Responded appropraitely. C/o severe right shoulder, slept well after  night meds. Up assist, voided had BM. IV Fluid on flow. Suicide precaution maintained. Sitter at bedside.

## 2020-06-08 NOTE — CONSULTS
"Pt very open to Pastoral Care and Assessment.  Pt expresses great regret that she acted on her suicidal thoughts but does note, \"I remember thinking 'I want to see Dadlois.' \"  Pt describes herself as \"a daddy's girl\" and notes, \"a few weeks after he  25 years ago I had my tubes tied because I was thinking, \"I don't want to have a child that would never know their grandfather.\"  We explored her guilt around her mother feeling depressed and abandoned in the nursing home, her own sadness not getting to visit  Mom and bring her home for a monthly visit.  Four years ago pt and her  and their two adult children were baptized at [facility] and participated meaningfully but have \"gotten out the habit.\"  Pt expressed great shame over overdose, we explored \"under stress we regress\" and noted the need for support and value of therapy to process.  Pt open to referral for outpatient counseling.  Closed with prayer which pt appreciated.   "

## 2020-06-08 NOTE — PROGRESS NOTES
Discharge Planning Assessment  [facility]     Patient Name: Concepcion Ocampo  MRN: [id number]  Today's Date: 6/8/2020    Admit Date: 6/6/2020    Discharge Needs Assessment     Row Name 06/08/20 1557       Living Environment    Lives With  spouse    Name(s) of Who Lives With Patient  spouse and adult daughter Tangela Ocampo [phone]    Current Living Arrangements  home/apartment/condo    Primary Care Provided by  self    Family Caregiver if Needed  none    Quality of Family Relationships  involved;supportive    Able to Return to Prior Arrangements  yes       Resource/Environmental Concerns    Transportation Concerns  car, none       Discharge Needs Assessment    Concerns to be Addressed  no discharge needs identified    Provided Post Acute Provider List?  Yes        Discharge Plan     Row Name 06/08/20 7623       Plan    Plan  return home with spouse and daughter     Plan Comments  Spoke with patient at bedside, requested sitter left the room. Patient lives with her spouse and adult daughter and stated she is independent with ADL's. Discussed discharge to Crisis Management Unit explaining they are full and [facility] has arranged a bed at [facility] in [place] spoke with Roxy [phone] liaison for [facility] she stated she can accept today or tomorrow. Patient stated she does not want to transfer to [facility] for treatment she stated she is feeling better and plans to return home with family and follow up outpatient. Call placed to [phone] and spoke with Zafar she stated Access will provide the patient with information for follow up. Fatemeh Powell, WILLY        Destination      Coordination has not been started for this encounter.      Durable Medical Equipment      Coordination has not been started for this encounter.      Dialysis/Infusion      Coordination has not been started for this encounter.      Home Medical Care      Coordination has not been started for this encounter.      Therapy       Coordination has not been started for this encounter.      Community Resources      Coordination has not been started for this encounter.          Demographic Summary     Row Name 06/08/20 1606       General Information    Admission Type  inpatient    Row Name 06/08/20 1552       General Information    Admission Type  observation    Arrived From  emergency department    Referral Source  admission list    Reason for Consult  discharge planning    Preferred Language  English     Used During This Interaction  no        Functional Status     Row Name 06/08/20 1552       Functional Status    Usual Activity Tolerance  good    Current Activity Tolerance  good       Functional Status, IADL    Medications  independent    Meal Preparation  independent    Housekeeping  independent    Laundry  independent    Shopping  independent        Psychosocial     Row Name 06/08/20 1558       Emotion Mood WDL    Emotion/Mood/Affect WDL  WDL        Abuse/Neglect    No documentation.       Legal    No documentation.       Substance Abuse    No documentation.       Patient Forms    No documentation.           Fatemeh Powell RN

## 2020-06-09 VITALS
HEART RATE: 86 BPM | HEIGHT: 67 IN | BODY MASS INDEX: 23.54 KG/M2 | WEIGHT: 150 LBS | SYSTOLIC BLOOD PRESSURE: 130 MMHG | DIASTOLIC BLOOD PRESSURE: 87 MMHG | TEMPERATURE: 97.4 F | RESPIRATION RATE: 18 BRPM | OXYGEN SATURATION: 98 %

## 2020-06-09 PROBLEM — T39.1X2A INTENTIONAL ACETAMINOPHEN OVERDOSE (HCC): Status: RESOLVED | Noted: 2020-06-07 | Resolved: 2020-06-09

## 2020-06-09 PROBLEM — R45.851 SUICIDAL IDEATIONS: Status: RESOLVED | Noted: 2020-06-07 | Resolved: 2020-06-09

## 2020-06-09 PROBLEM — F10.929 ALCOHOL INTOXICATION (HCC): Status: RESOLVED | Noted: 2020-06-07 | Resolved: 2020-06-09

## 2020-06-09 PROCEDURE — G0378 HOSPITAL OBSERVATION PER HR: HCPCS

## 2020-06-09 RX ADMIN — GABAPENTIN 100 MG: 100 CAPSULE ORAL at 08:24

## 2020-06-09 RX ADMIN — OXYCODONE 5 MG: 5 TABLET ORAL at 00:20

## 2020-06-09 RX ADMIN — OXYCODONE 5 MG: 5 TABLET ORAL at 06:06

## 2020-06-09 RX ADMIN — OXYCODONE 5 MG: 5 TABLET ORAL at 09:58

## 2020-06-09 RX ADMIN — PANTOPRAZOLE SODIUM 40 MG: 40 TABLET, DELAYED RELEASE ORAL at 06:06

## 2020-06-09 RX ADMIN — OXYCODONE 5 MG: 5 TABLET ORAL at 13:58

## 2020-06-09 NOTE — PROGRESS NOTES
Continued Stay Note  [facility]     Patient Name: Concepcion Ocampo  MRN: [id number]  Today's Date: 6/9/2020    Admit Date: 6/6/2020    Discharge Plan     Row Name 06/09/20 1338       Plan    Plan  Home     Plan Comments  CCP met with patient at bedside; patient confirmed plan is to return home and has list of resources provided by access center. Patient states she has transportation to OP services. Patient denies any needs. CCP will follow for discharge home. Teresa NORTON         Discharge Codes    No documentation.       Expected Discharge Date and Time     Expected Discharge Date Expected Discharge Time    Jun 9, 2020             ERROL Narayan

## 2020-06-09 NOTE — PROGRESS NOTES
Case Management Discharge Note      Final Note: Home; self-care. Teresa NORTON     Provided Post Acute Provider List?: Yes    Destination      No service has been selected for the patient.      Durable Medical Equipment      No service has been selected for the patient.      Dialysis/Infusion      No service has been selected for the patient.      Home Medical Care      No service has been selected for the patient.      Therapy      No service has been selected for the patient.      Community Resources      No service has been selected for the patient.             Final Discharge Disposition Code: 01 - home or self-care

## 2020-06-09 NOTE — NURSING NOTE
"Access center follow up:    Pt lying in bed upon approach.  Pt again denies any thoughts of suicide.  Pt reiterates that she would have \"never done that if I had been sober\".  Pt states that she \"is still not sure why I did it, I feel so stupid\".  Pt reports that this is a \"big wake up call\" for her and how much she has to live for, including her family and pets.  Pt promises safety outside hospital and is willing to take referrals for outpatient therapists.  Spoke with pt's daughter Tangela, 26 yo, who lives with pt.  She believes pt will be safe at home and will encourage pt to follow up with outpatient services. Tangela reports pt has never voiced SI or had any attempts in the past.  was at work and unavailable to speak to.  Spoke with Dr Iverson and he is agreeable with pt discharging home with resources.  "

## 2020-06-09 NOTE — DISCHARGE SUMMARY
[facility]    Date of Discharge:  6/9/2020    PCP: Jeremy Shankar MD    Discharge Diagnosis:   Active Hospital Problems    Diagnosis  POA   • S/P shoulder surgery [Z98.890]  Not Applicable   • Hypertension [I10]  Yes   • Type 2 diabetes mellitus without complication, without long-term current use of insulin (CMS/[facility]) [E11.9]  Yes   • History of pseudoseizure [Z86.69]  Not Applicable   • Chronic pain syndrome [G89.4]  Yes   • Opioid dependence (CMS/HCC) [F11.20]  Yes      Resolved Hospital Problems    Diagnosis Date Resolved POA   • **Intentional acetaminophen overdose (CMS/HCC) [T39.1X2A] 06/09/2020 Yes   • Alcohol intoxication (CMS/[facility]) [F10.929] 06/09/2020 Yes   • Suicidal ideations [R45.851] 06/09/2020 Not Applicable     Procedures Performed       Consults     Date and Time Order Name Status Description    6/8/2020 0747 Inpatient Psychiatrist Consult Completed     6/7/2020 1122 [facility] (on-call MD unless specified) Details Completed         Hospital Course  Please see history and physical for details. Patient is a 44 y.o. female admitted following an intentional Tylenol overdose along with alcohol ingestion.  She is prescribed Percocet and oxycodone but denies overdosing on these medications.  Upon admission a sitter was placed at bedside and psychiatry was consulted.  She was monitored for alcohol withdrawal but states she rarely drinks except for the other night. Tylenol level normalized and she has no evidence of liver failure.  According to patient she her depression has been progressing and is associated with the death of her father and recent placement of her mother in a nursing facility.  She has had no evidence of alcohol withdrawal.  Patient is regretful of her actions and describes episode as a \"big wake up call\" and her desires to continue outpatient therapy.  Lives with her 25-year-old daughter and .  She feels confident that her family " will watch her closely to ensure her successful recovery and continued participation in therapy.  Access/psychiatry has approved discharge today.  Overall she is stable for discharge today to return home to her family for continued outpatient treatment.    I discussed the patient's findings and my recommendations with patient, family, nursing staff and consulting provider.    Condition on Discharge: Improved.     Temp:  [97.1 °F (36.2 °C)-98.3 °F (36.8 °C)] 97.4 °F (36.3 °C)  Heart Rate:  [67-97] 86  Resp:  [18] 18  BP: (106-152)/(67-95) 130/87  Body mass index is 23.49 kg/m².    Physical Exam   Constitutional: She is oriented to person, place, and time. She appears well-developed and well-nourished. No distress.   HENT:   Head: Normocephalic and atraumatic.   Nose: Nose normal.   Mouth/Throat: Oropharynx is clear and moist.   Eyes: Conjunctivae and EOM are normal.   Neck: Normal range of motion. Neck supple. No tracheal deviation present. No thyromegaly present.   Cardiovascular: Normal rate, regular rhythm and normal heart sounds. Exam reveals no gallop and no friction rub.   No murmur heard.  Pulmonary/Chest: Effort normal and breath sounds normal. No respiratory distress. She has no wheezes. She has no rales. She exhibits no tenderness.   Abdominal: Soft. Bowel sounds are normal. She exhibits no distension and no mass. There is no tenderness. There is no rebound and no guarding. No hernia.   Musculoskeletal: Normal range of motion.   Neurological: She is alert and oriented to person, place, and time.   Skin: Skin is warm and dry.   Psychiatric: She has a normal mood and affect. Her behavior is normal. Judgment and thought content normal.   Denies self-harm or suicidal ideations        Discharge Medications      Continue These Medications      Instructions Start Date   AMITRIPTYLINE HCL PO   50 mg, Oral, Nightly      GABAPENTIN PO   100 mg, Oral, 3 Times Daily      meloxicam 15 MG tablet  Commonly known as:   MOBIC   15 mg, Oral, Daily PRN      OMEPRAZOLE PO   40 mg, Oral, Daily      OXYCODONE HCL PO   5 mg, Oral, Every 4 Hours PRN      oxyCODONE-acetaminophen 5-325 MG per tablet  Commonly known as:  PERCOCET   1 tablet, Oral, Every 6 Hours PRN      pramipexole 1 MG tablet  Commonly known as:  MIRAPEX   1 mg, Oral, Nightly      PROPRANOLOL HCL PO   60 mg, Oral, Nightly      QUETIAPINE FUMARATE PO   300 mg, Oral, Nightly      TIZANIDINE HCL PO   4 mg, Oral, Nightly PRN      Trintellix 10 MG tablet  Generic drug:  Vortioxetine HBr   10 mg, Oral, Daily      ZOFRAN PO   4 mg, Oral, Every 6 Hours PRN            Diet Instructions     Diet: Regular, Cardiac      Discharge Diet:   Regular  Cardiac            Activity Instructions     Activity as Tolerated           Follow-up Information     Jeremy Shankar MD .    Specialty:  Family Medicine  Contact information:  [address] [address]  [phone]                 Test Results Pending at Discharge     JANET Ruiz  06/09/20  14:10    Discharge time spent greater than 35 minutes.

## 2020-06-09 NOTE — PLAN OF CARE
Problem: Patient Care Overview  Goal: Plan of Care Review  Outcome: Ongoing (interventions implemented as appropriate)  Flowsheets (Taken 6/9/2020 0314)  Progress: improving  Plan of Care Reviewed With: patient  Outcome Summary: doingwell, accepting, calm cooperative, pleasant, c/o rt shoulder pain medicated with Oxycodone, Rt arm sling in place, VSS, Psych will reassess in am     Problem: Patient Care Overview  Goal: Discharge Needs Assessment  6/9/2020 0316 by Honey Barrera RN  Flowsheets (Taken 6/9/2020 0316)  Concerns to be Addressed: discharge planning  Readmission Within the Last 30 Days: no previous admission in last 30 days     Problem: Suicidal Behavior (Adult)  Goal: Suicidal Behavior is Absent/Minimized/Managed  6/9/2020 0316 by Honey Barrera RN  Flowsheets (Taken 6/9/2020 0316)  Suicidal Behavior Managed/Minimized Action Step (STG) Outcome: making progress toward outcome

## 2020-06-10 ENCOUNTER — READMISSION MANAGEMENT (OUTPATIENT)
Dept: CALL CENTER | Facility: HOSPITAL | Age: 45
End: 2020-06-10

## 2020-06-10 NOTE — OUTREACH NOTE
Prep Survey      Responses   [facility] facility patient discharged from?  [place]   Is LACE score < 7 ?  No   Eligibility  Readm Mgmt   Discharge diagnosis  Intentional acetaminophen overdose    COVID-19 Test Status  Not tested   Does the patient have one of the following disease processes/diagnoses(primary or secondary)?  Other   Does the patient have Home health ordered?  No   Is there a DME ordered?  No   Prep survey completed?  Yes          Eleanor Bernal RN

## 2020-06-12 ENCOUNTER — READMISSION MANAGEMENT (OUTPATIENT)
Dept: CALL CENTER | Facility: HOSPITAL | Age: 45
End: 2020-06-12

## 2020-06-12 NOTE — OUTREACH NOTE
Medical Week 1 Survey      Responses   [facility] patient discharged from?  [place]   Does the patient have one of the following disease processes/diagnoses(primary or secondary)?  Other   Is there a successful TCM telephone encounter documented?  No   Week 1 attempt successful?  Yes   Call start time  1156   Revoke  Decline to participate   Call end time  1156          Bonnie Weldon RN

## 2022-08-14 ENCOUNTER — INPATIENT HOSPITAL (OUTPATIENT)
Dept: URBAN - METROPOLITAN AREA HOSPITAL 107 | Facility: HOSPITAL | Age: 47
End: 2022-08-14

## 2022-08-14 DIAGNOSIS — R12 HEARTBURN: ICD-10-CM

## 2022-08-14 DIAGNOSIS — R05.3 CHRONIC COUGH: ICD-10-CM

## 2022-08-14 PROCEDURE — 99222 1ST HOSP IP/OBS MODERATE 55: CPT | Performed by: INTERNAL MEDICINE

## 2022-08-15 ENCOUNTER — ON CAMPUS - OUTPATIENT (OUTPATIENT)
Dept: URBAN - METROPOLITAN AREA HOSPITAL 108 | Facility: HOSPITAL | Age: 47
End: 2022-08-15

## 2022-08-15 DIAGNOSIS — R63.39 OTHER FEEDING DIFFICULTIES: ICD-10-CM

## 2022-08-15 DIAGNOSIS — R13.19 OTHER DYSPHAGIA: ICD-10-CM

## 2022-08-15 DIAGNOSIS — K21.9 GASTRO-ESOPHAGEAL REFLUX DISEASE WITHOUT ESOPHAGITIS: ICD-10-CM

## 2022-08-15 DIAGNOSIS — T17.400A: ICD-10-CM

## 2022-08-15 DIAGNOSIS — Z98.84 BARIATRIC SURGERY STATUS: ICD-10-CM

## 2022-08-15 PROCEDURE — 99212 OFFICE O/P EST SF 10 MIN: CPT | Performed by: PHYSICIAN ASSISTANT
